# Patient Record
Sex: FEMALE | Race: WHITE | Employment: STUDENT | URBAN - METROPOLITAN AREA
[De-identification: names, ages, dates, MRNs, and addresses within clinical notes are randomized per-mention and may not be internally consistent; named-entity substitution may affect disease eponyms.]

---
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
## 2019-01-03 PROCEDURE — 80177 DRUG SCRN QUAN LEVETIRACETAM: CPT | Performed by: OTHER

## 2019-06-07 PROCEDURE — 80177 DRUG SCRN QUAN LEVETIRACETAM: CPT | Performed by: OTHER

## 2019-06-07 PROCEDURE — 36415 COLL VENOUS BLD VENIPUNCTURE: CPT | Performed by: OTHER

## 2019-06-28 PROCEDURE — 80177 DRUG SCRN QUAN LEVETIRACETAM: CPT | Performed by: OTHER

## 2020-11-19 NOTE — TELEPHONE ENCOUNTER
Called pt's Mom back but vm not set up yet.
Called pt's Mom but no vm box set up message closed at this time due to unable to contact patient or her Mom
Called pt's Mom but she does not have vm box set up yet.
Called pt's Mom but vm box not set up yet.
Jonel Rodriguez    Pt was in on 7/2017 for her yearly wellness visit. Please advise.   Or does she need to come in ?
Juan Rahman would like to start taking the oral contraceptive again, she is not liking the Nuva ring, and she is just wondering what Monik Espinal needs her to do, does she need to make a appt or can something be called in for her.  Please call Namita aHrmon (Mom) at [phone]
OK to restart LUTERA 0.1-20 MG-MCG Oral Tab finish this Nuva Ring then restart the OCP use condoms if sexually active. I also am able to insert the Nexplanon here in the office now if she is still considering this option.
non specific ST changes

## 2021-03-06 ENCOUNTER — LAB ENCOUNTER (OUTPATIENT)
Dept: LAB | Age: 21
End: 2021-03-06
Attending: Other
Payer: COMMERCIAL

## 2021-03-06 DIAGNOSIS — E87.1 HYPONATREMIA: ICD-10-CM

## 2021-03-06 DIAGNOSIS — R56.9 SEIZURE (HCC): ICD-10-CM

## 2021-03-06 LAB
ALBUMIN SERPL-MCNC: 4.5 G/DL (ref 3.4–5)
ALBUMIN/GLOB SERPL: 1.5 {RATIO} (ref 1–2)
ALP LIVER SERPL-CCNC: 74 U/L
ALT SERPL-CCNC: 30 U/L
ANION GAP SERPL CALC-SCNC: 4 MMOL/L (ref 0–18)
AST SERPL-CCNC: 20 U/L (ref 15–37)
BILIRUB SERPL-MCNC: 0.5 MG/DL (ref 0.1–2)
BUN BLD-MCNC: 10 MG/DL (ref 7–18)
BUN/CREAT SERPL: 14.3 (ref 10–20)
CALCIUM BLD-MCNC: 9.2 MG/DL (ref 8.5–10.1)
CHLORIDE SERPL-SCNC: 97 MMOL/L (ref 98–112)
CO2 SERPL-SCNC: 26 MMOL/L (ref 21–32)
CREAT BLD-MCNC: 0.7 MG/DL
GLOBULIN PLAS-MCNC: 3.1 G/DL (ref 2.8–4.4)
GLUCOSE BLD-MCNC: 89 MG/DL (ref 70–99)
M PROTEIN MFR SERPL ELPH: 7.6 G/DL (ref 6.4–8.2)
OSMOLALITY SERPL CALC.SUM OF ELEC: 263 MOSM/KG (ref 275–295)
PATIENT FASTING Y/N/NP: YES
POTASSIUM SERPL-SCNC: 4.2 MMOL/L (ref 3.5–5.1)
SODIUM SERPL-SCNC: 127 MMOL/L (ref 136–145)

## 2021-03-06 PROCEDURE — 80177 DRUG SCRN QUAN LEVETIRACETAM: CPT

## 2021-03-06 PROCEDURE — 80053 COMPREHEN METABOLIC PANEL: CPT

## 2021-03-06 PROCEDURE — 80183 DRUG SCRN QUANT OXCARBAZEPIN: CPT

## 2021-03-06 PROCEDURE — 36415 COLL VENOUS BLD VENIPUNCTURE: CPT

## 2021-03-09 LAB — LEVETIRACETAM (KEPPRA): 38 UG/ML

## 2021-03-10 LAB — OXCARBAZEPINE METABOLITE: 16 UG/ML

## 2021-03-20 ENCOUNTER — PATIENT MESSAGE (OUTPATIENT)
Dept: FAMILY MEDICINE CLINIC | Facility: CLINIC | Age: 21
End: 2021-03-20

## 2021-07-14 ENCOUNTER — OFFICE VISIT (OUTPATIENT)
Dept: FAMILY MEDICINE CLINIC | Facility: CLINIC | Age: 21
End: 2021-07-14
Payer: COMMERCIAL

## 2021-07-14 VITALS
HEART RATE: 72 BPM | BODY MASS INDEX: 27.82 KG/M2 | HEIGHT: 69.21 IN | TEMPERATURE: 98 F | DIASTOLIC BLOOD PRESSURE: 60 MMHG | WEIGHT: 190 LBS | SYSTOLIC BLOOD PRESSURE: 90 MMHG

## 2021-07-14 DIAGNOSIS — Z11.3 SCREENING FOR GONORRHEA: ICD-10-CM

## 2021-07-14 DIAGNOSIS — Z00.00 LABORATORY EXAMINATION ORDERED AS PART OF A ROUTINE GENERAL MEDICAL EXAMINATION: ICD-10-CM

## 2021-07-14 DIAGNOSIS — Z97.5 IUD (INTRAUTERINE DEVICE) IN PLACE: ICD-10-CM

## 2021-07-14 DIAGNOSIS — Z12.4 SCREENING FOR MALIGNANT NEOPLASM OF CERVIX: ICD-10-CM

## 2021-07-14 DIAGNOSIS — Z11.8 SCREENING FOR CHLAMYDIAL DISEASE: ICD-10-CM

## 2021-07-14 DIAGNOSIS — Z01.419 WELL FEMALE EXAM WITH ROUTINE GYNECOLOGICAL EXAM: Primary | ICD-10-CM

## 2021-07-14 DIAGNOSIS — Z23 NEED FOR VACCINATION: ICD-10-CM

## 2021-07-14 PROCEDURE — 99385 PREV VISIT NEW AGE 18-39: CPT | Performed by: FAMILY MEDICINE

## 2021-07-14 PROCEDURE — 88175 CYTOPATH C/V AUTO FLUID REDO: CPT | Performed by: FAMILY MEDICINE

## 2021-07-14 PROCEDURE — 87491 CHLMYD TRACH DNA AMP PROBE: CPT | Performed by: FAMILY MEDICINE

## 2021-07-14 PROCEDURE — 3008F BODY MASS INDEX DOCD: CPT | Performed by: FAMILY MEDICINE

## 2021-07-14 PROCEDURE — 87591 N.GONORRHOEAE DNA AMP PROB: CPT | Performed by: FAMILY MEDICINE

## 2021-07-14 PROCEDURE — 90471 IMMUNIZATION ADMIN: CPT | Performed by: FAMILY MEDICINE

## 2021-07-14 PROCEDURE — 3074F SYST BP LT 130 MM HG: CPT | Performed by: FAMILY MEDICINE

## 2021-07-14 PROCEDURE — 3078F DIAST BP <80 MM HG: CPT | Performed by: FAMILY MEDICINE

## 2021-07-14 PROCEDURE — 90715 TDAP VACCINE 7 YRS/> IM: CPT | Performed by: FAMILY MEDICINE

## 2021-07-14 RX ORDER — LEVONORGESTREL 52 MG/1
1 INTRAUTERINE DEVICE INTRAUTERINE ONCE
COMMUNITY

## 2021-07-14 NOTE — PATIENT INSTRUCTIONS
Routine Healthcare for Women   Routine checkups can find treatable problems early. For many medical problems, early treatment can help prevent more serious complications. The value of checkups and how often you have them depend mainly on your age.  Your per family history of high cholesterol. • Colorectal cancer test: if you are 48 or older.  Recommended tests include a yearly test for blood in the stool, called the fecal occult blood test (FOBT) or fecal immunochemical test (FIT), and one of the following t personal and family medical history. Many other tests are often done at routine checkups, but there is no current evidence that they are helpful as routine screening tests for healthy women.  Examples of such tests are a CBC (complete blood count), thyroi a younger age if you have a high-risk medical condition, such as diabetes. • Varicella (chickenpox) if you have never had chickenpox. • Zoster (shingles) vaccine: if you are 50 or older. The vaccine can help prevent shingles.  It can also reduce the ana

## 2021-07-14 NOTE — PROGRESS NOTES
HPI:   Ro Ingram is a 24year old female who presents for a complete physical exam.   Symptoms: denies discharge, itching, burning or dysuria periods on and off not consistent.     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 . : No. Children: No.   Exercise: 6 times per week.   Diet: watches fats closely and tenderness  :external labia, introitus and vagina are normal, normal discharge and normal cervix. Bimanual exam normal no adnexal masses or cervical motion tenderness, PAP was done IUD strings  present  MUSCULOSKELETAL: gait krystian,l no gross M/S defect. THINPREP PAP WITH HPV REFLEX REQUEST B; Future  - THINPREP PAP WITH HPV REFLEX REQUEST B  - THINPREP PAP WITH HPV REFLEX REQUEST    5.  Need for vaccination  - TETANUS, DIPHTHERIA TOXOIDS AND ACELLULAR PERTUSIS VACCINE (TDAP), >7 YEARS, IM USE  - IMMUNIZATI

## 2021-07-15 PROBLEM — Z97.5 IUD (INTRAUTERINE DEVICE) IN PLACE: Status: ACTIVE | Noted: 2021-07-15

## 2021-07-15 LAB
C TRACH DNA SPEC QL NAA+PROBE: NEGATIVE
N GONORRHOEA DNA SPEC QL NAA+PROBE: NEGATIVE

## 2021-07-18 LAB
ABSOLUTE BASOPHILS: 57 CELLS/UL (ref 0–200)
ABSOLUTE EOSINOPHILS: 296 CELLS/UL (ref 15–500)
ABSOLUTE LYMPHOCYTES: 1208 CELLS/UL (ref 850–3900)
ABSOLUTE MONOCYTES: 547 CELLS/UL (ref 200–950)
ABSOLUTE NEUTROPHILS: 3591 CELLS/UL (ref 1500–7800)
BASOPHILS: 1 %
CHOL/HDLC RATIO: 2.9 (CALC)
CHOLESTEROL, TOTAL: 165 MG/DL
EOSINOPHILS: 5.2 %
HDL CHOLESTEROL: 57 MG/DL
HEMATOCRIT: 38.5 % (ref 35–45)
HEMOGLOBIN: 13.1 G/DL (ref 11.7–15.5)
LDL-CHOLESTEROL: 94 MG/DL (CALC)
LYMPHOCYTES: 21.2 %
MCH: 30 PG (ref 27–33)
MCHC: 34 G/DL (ref 32–36)
MCV: 88.3 FL (ref 80–100)
MITOGEN-NIL: >10 IU/ML
MONOCYTES: 9.6 %
MPV: 11.6 FL (ref 7.5–12.5)
NEUTROPHILS: 63 %
NIL: 0.02 IU/ML
NON-HDL CHOLESTEROL: 108 MG/DL (CALC)
PLATELET COUNT: 246 THOUSAND/UL (ref 140–400)
QUANTIFERON(R)-TB GOLD PLUS, 1 TUBE: NEGATIVE
RDW: 11.5 % (ref 11–15)
RED BLOOD CELL COUNT: 4.36 MILLION/UL (ref 3.8–5.1)
TB1-NIL: 0.01 IU/ML
TB2-NIL: 0 IU/ML
TRIGLYCERIDES: 62 MG/DL
TSH: 2.07 MIU/L
WHITE BLOOD CELL COUNT: 5.7 THOUSAND/UL (ref 3.8–10.8)

## 2021-07-22 ENCOUNTER — PATIENT MESSAGE (OUTPATIENT)
Dept: FAMILY MEDICINE CLINIC | Facility: CLINIC | Age: 21
End: 2021-07-22

## 2021-07-23 NOTE — TELEPHONE ENCOUNTER
From: Jayson Paula  To:  Anyi Wynne PA-C  Sent: 7/22/2021 12:02 PM CDT  Subject: Visit Follow-up Question    Ezekiel Vargas,    I noticed the physical results were back and was hoping you'd be able to fax in the physical examination form (the one yamileth

## 2021-08-04 ENCOUNTER — APPOINTMENT (OUTPATIENT)
Dept: GENERAL RADIOLOGY | Age: 21
End: 2021-08-04
Payer: COMMERCIAL

## 2021-08-04 ENCOUNTER — HOSPITAL ENCOUNTER (OUTPATIENT)
Age: 21
Discharge: HOME | End: 2021-08-04
Attending: EMERGENCY MEDICINE
Payer: COMMERCIAL

## 2021-08-04 VITALS
HEIGHT: 70 IN | DIASTOLIC BLOOD PRESSURE: 74 MMHG | HEART RATE: 66 BPM | TEMPERATURE: 98 F | SYSTOLIC BLOOD PRESSURE: 110 MMHG | RESPIRATION RATE: 16 BRPM | WEIGHT: 180 LBS | OXYGEN SATURATION: 98 % | BODY MASS INDEX: 25.77 KG/M2

## 2021-08-04 DIAGNOSIS — J20.8 ACUTE VIRAL BRONCHITIS: Primary | ICD-10-CM

## 2021-08-04 LAB — SARS-COV-2 RNA RESP QL NAA+PROBE: NOT DETECTED

## 2021-08-04 PROCEDURE — 99213 OFFICE O/P EST LOW 20 MIN: CPT

## 2021-08-04 PROCEDURE — 99204 OFFICE O/P NEW MOD 45 MIN: CPT

## 2021-08-04 PROCEDURE — 71046 X-RAY EXAM CHEST 2 VIEWS: CPT | Performed by: EMERGENCY MEDICINE

## 2021-08-04 RX ORDER — ALBUTEROL SULFATE 90 UG/1
2 AEROSOL, METERED RESPIRATORY (INHALATION) EVERY 4 HOURS PRN
Qty: 1 EACH | Refills: 0 | Status: SHIPPED | OUTPATIENT
Start: 2021-08-04 | End: 2021-09-03

## 2021-08-04 NOTE — ED INITIAL ASSESSMENT (HPI)
Patient states she woke up around 1am with coughing and wheezing that kept waking her up and she woke up around 5am because it continued to bother her. She states yesterday she was having some headaches with some minimal coughing but no wheezing.  Denies an HEADACHE

## 2021-08-04 NOTE — ED PROVIDER NOTES
Patient Seen in: [facility]      History   Patient presents with:  Cough/URI    Stated Complaint: wheezing last night,cough    HPI/Subjective:   HPI    79-year-old woman who woke up last night at 1 AM with coughing.   She felt that she was nontender. No abdominal masses. No peritoneal signs   Extremities: no edema, normal peripheral pulses.   No calf tenderness or lower extremity asymmetry  Neuro: Alert oriented and nonfocal     ED Course     Labs Reviewed   RAPID SARS-COV-2 BY PCR - Normal Prescribed:  Current Discharge Medication List    START taking these medications    Albuterol Sulfate  (90 Base) MCG/ACT Inhalation Aero Soln  Inhale 2 puffs into the lungs every 4 (four) hours as needed for Wheezing.   Qty: 1 each Refills: 0

## 2021-12-13 ENCOUNTER — PATIENT MESSAGE (OUTPATIENT)
Dept: FAMILY MEDICINE CLINIC | Facility: CLINIC | Age: 21
End: 2021-12-13

## 2021-12-13 NOTE — TELEPHONE ENCOUNTER
From: Deepak Landeros  To: Giovanny Swift PA-C  Sent: 12/13/2021 12:27 AM CST  Subject: Sabina Eye    Dear . Gabriella Riley am having a lot of digestive and bowel issues.  Selina Garcias been having a lot of stomach pain, cramps, constipation, and di

## 2021-12-27 ENCOUNTER — PATIENT MESSAGE (OUTPATIENT)
Dept: FAMILY MEDICINE CLINIC | Facility: CLINIC | Age: 21
End: 2021-12-27

## 2021-12-27 NOTE — TELEPHONE ENCOUNTER
From: Shravan Pringle  To: Tess Urbano PA-C  Sent: 12/27/2021 1:11 PM CST  Subject: Cannot Make Appointment/Not feeling well    Dear Tess Urbano,    I am sorry I could not make it in today.  I was not feeling well last night, and tried to see how

## 2022-01-17 NOTE — PATIENT INSTRUCTIONS
Diet and Lifestyle Tips for Irritable Bowel Syndrome (IBS)   Your healthcare provider may suggest some lifestyle changes to help control your IBS. Changing your diet and managing stress are 2 of the most important changes.  Follow your healthcare provid moderate activity each week. Or you can get 75 minutes of vigorous activity each week. It also advises muscle-strengthening activities 2 days a week. If this sounds like a lot of time, the CDC suggests breaking physical activity into 10-minute blocks.  You

## 2022-01-17 NOTE — PROGRESS NOTES
Spencer Marion is a 24year old female. HPI:   Audrey Carrero is in for evaluation and chronic constipation since she was young and generalized anxiety disorder symptoms. Patient states that she has been able to be off gluten and dairy which has helped mildly. 7/16/2021   WBC      3.8 - 10.8 Thousand/uL 5.7   RBC      3.80 - 5.10 Million/uL 4.36   Hemoglobin      11.7 - 15.5 g/dL 13.1   Hematocrit      35.0 - 45.0 % 38.5   MCV      80.0 - 100.0 fL 88.3   MCH      27.0 - 33.0 pg 30.0   MCHC      32.0 - 36.0 g/dL hopeless: Several days    PHQ-2 SCORE: 1  PHQ-2 SCORE: 1   1. Little interest or pleasure in doing things: Not at all  2. Feeling down, depressed, or hopeless: Several days  3. Trouble falling or staying asleep, or sleeping too much: Not at all  4.  Feeling tobacco: Never Used    Vaping Use      Vaping Use: Never used    Alcohol use:  Yes      Alcohol/week: 0.0 standard drinks      Comment: 0-1 drinks monthly    Drug use: Never       REVIEW OF SYSTEMS:   GENERAL HEALTH: feels well otherwise  SKIN: denies any u 5     Sig: Take 145 mcg by mouth every morning. • hydrOXYzine 10 MG Oral Tab 30 tablet 1     Sig: Take 1-5 tablets (10-50 mg total) by mouth every 8 (eight) hours as needed for Anxiety. Imaging & Consults:  None  1.  YOSELIN (generalized anxiety disorde

## 2022-01-18 PROBLEM — K59.01 SLOW TRANSIT CONSTIPATION: Status: ACTIVE | Noted: 2022-01-18

## 2022-01-18 PROBLEM — F41.1 GAD (GENERALIZED ANXIETY DISORDER): Status: ACTIVE | Noted: 2022-01-18

## 2022-01-18 PROBLEM — K58.1 IRRITABLE BOWEL SYNDROME WITH CONSTIPATION: Status: ACTIVE | Noted: 2022-01-18

## 2022-04-09 ENCOUNTER — PATIENT MESSAGE (OUTPATIENT)
Dept: FAMILY MEDICINE CLINIC | Facility: CLINIC | Age: 22
End: 2022-04-09

## 2022-04-11 RX ORDER — LINACLOTIDE 145 UG/1
145 CAPSULE, GELATIN COATED ORAL EVERY MORNING
Qty: 90 CAPSULE | Refills: 1 | Status: SHIPPED | OUTPATIENT
Start: 2022-04-11 | End: 2022-05-11

## 2022-08-01 ENCOUNTER — TELEPHONE (OUTPATIENT)
Dept: FAMILY MEDICINE CLINIC | Facility: CLINIC | Age: 22
End: 2022-08-01

## 2022-08-01 RX ORDER — BRIVARACETAM 100 MG/1
TABLET, FILM COATED ORAL
COMMUNITY
Start: 2022-06-21

## 2022-08-01 RX ORDER — LINACLOTIDE 145 UG/1
CAPSULE, GELATIN COATED ORAL
COMMUNITY
Start: 2022-06-21

## 2022-08-01 NOTE — TELEPHONE ENCOUNTER
Patient mom calling did home covid test last night and was positive    C/o sore throat coughing body aches

## 2022-08-01 NOTE — TELEPHONE ENCOUNTER
Symptom onset Saturday 7/30/22. Symptoms include sore throat, fever, body aches, cough, congestion. Denies shortness of breath. She is taking tylenol for fever and aches. She asked if paxlovid could be prescribed. Med list is up to date.  Last OV 1/17/22

## 2022-08-01 NOTE — TELEPHONE ENCOUNTER
She is healthy not sure it is needed I am usually prescribing for someone who has health risks. . There are interactions. Get confirmation on her medication list.  Let me know if there are any additional RX or OTC she takes.     She does have two interactions with Paxlovid    If taking it with Vimpat has to monitor heart rate and can increase the risk of IA interval prolongation, AV block, bradycardia lower doses of Vimpat should be considered    Caution advised with Liletta combination may decrease hormone contraceptive levels

## 2025-01-22 ENCOUNTER — PATIENT OUTREACH (OUTPATIENT)
Dept: CASE MANAGEMENT | Age: 25
End: 2025-01-22

## 2025-01-22 NOTE — PROCEDURES
The office order for PCP removal request is Approved and finalized on January 22, 2025.    Removed Giselle Denton DO as the patient's Primary Care Physician

## 2025-05-03 ENCOUNTER — LAB ENCOUNTER (OUTPATIENT)
Dept: LAB | Age: 25
End: 2025-05-03
Attending: Other
Payer: COMMERCIAL

## 2025-05-03 DIAGNOSIS — R56.9 SEIZURE (HCC): Primary | ICD-10-CM

## 2025-05-03 PROCEDURE — 80339 ANTIEPILEPTICS NOS 1-3: CPT

## 2025-05-03 PROCEDURE — 36415 COLL VENOUS BLD VENIPUNCTURE: CPT

## 2025-05-12 LAB — LACOSAMIDE: 12.4 UG/ML

## (undated) DIAGNOSIS — K58.1 IRRITABLE BOWEL SYNDROME WITH CONSTIPATION: ICD-10-CM

## (undated) NOTE — MR AVS SNAPSHOT
After Visit Summary   7/14/2021    Gracie Alanis    MRN: [id number]           Visit Information     Date & Time  7/14/2021  3:30 PM Provider  Ulisses Pederson PA-C [address], [facility].  Phone  [phone] Ordered the Following     Normal Orders This Visit    ASSAY, THYROID STIM HORMONE [[id number] CUSTOM]     CBC WITH DIFFERENTIAL WITH PLATELET [2199433 CUSTOM]     CHLAMYDIA/GONOCOCCUS, CANDIE [[id number] CUSTOM]     IMMUNIZATION ADMINISTRATION [23244 CPT(R)]     LI age 36to 79years old, who are in good health, should be screened for breast cancer with mammography every 1 to 2 years after counseling by their healthcare provider about the possible risks and benefits of the procedure.  If you are over 70, ask your heal high risk of TB; for example, because you are a health worker, drug user, or immigrant, or because you have close contact with someone infected with TB   • Bone density test for osteoporosis: at age 72 years if your risk is normal and at age 61 if you have and rubella shot (MMR) if you were born after 1957 unless you have already had the shot or the diseases   • Hepatitis A shot if you are at risk, for example, through travel or your job, including  service   • Hepatitis B shot for all teens and [employer] your teeth with fluoride toothpaste daily. Also floss your teeth daily.    • Sexual behavior: Prevent sexually transmitted infections by avoiding high-risk sexual behavior and by using latex or polyurethane condoms every time you have sexually contact if yo [place]  OFFICE VISIT   Primary Care Providers  Treatment for mild illness or injury that does not require immediate attention.  Average cost  $70*   [facility]  Monday – Friday  8:00 am – 8:00 pm   Saturday – Sunday

## (undated) NOTE — LETTER
Date: 1/17/2022    Patient Name: Julia Swan          To Whom it may concern: The above patient was seen at the [facility] for treatment of a medical condition.     This patient should be allowed to leave her testing due to IBS (irritab

## (undated) NOTE — LETTER
08/14/17        [facility]      Dear Renetta Echavarria,    [facility] records indicate that you have outstanding lab work and or testing that was ordered for you and has not yet been completed:       Celiac Disease Panel [E]   B12